# Patient Record
Sex: MALE | Race: BLACK OR AFRICAN AMERICAN | NOT HISPANIC OR LATINO | URBAN - METROPOLITAN AREA
[De-identification: names, ages, dates, MRNs, and addresses within clinical notes are randomized per-mention and may not be internally consistent; named-entity substitution may affect disease eponyms.]

---

## 2023-12-18 ENCOUNTER — OFFICE VISIT (OUTPATIENT)
Dept: URBAN - METROPOLITAN AREA CLINIC 84 | Facility: CLINIC | Age: 73
End: 2023-12-18
Payer: MEDICARE

## 2023-12-18 VITALS
WEIGHT: 194.6 LBS | HEIGHT: 76 IN | DIASTOLIC BLOOD PRESSURE: 84 MMHG | HEART RATE: 89 BPM | TEMPERATURE: 97 F | BODY MASS INDEX: 23.7 KG/M2 | SYSTOLIC BLOOD PRESSURE: 146 MMHG

## 2023-12-18 DIAGNOSIS — R10.33 PERIUMBILICAL PAIN: ICD-10-CM

## 2023-12-18 PROCEDURE — 99213 OFFICE O/P EST LOW 20 MIN: CPT | Performed by: INTERNAL MEDICINE

## 2023-12-18 RX ORDER — HYDROCODONE BITARTRATE AND ACETAMINOPHEN 5; 325 MG/1; MG/1
TABLET ORAL
Qty: 90 TABLET | Status: ACTIVE | COMMUNITY

## 2023-12-18 RX ORDER — FAMOTIDINE 20 MG/1
TABLET ORAL
Qty: 90 TABLET | Status: ACTIVE | COMMUNITY

## 2023-12-18 RX ORDER — ROSUVASTATIN CALCIUM 5 MG/1
TABLET, FILM COATED ORAL
Qty: 90 TABLET | Status: ACTIVE | COMMUNITY

## 2023-12-18 RX ORDER — DUTASTERIDE 0.5 MG/1
CAPSULE, LIQUID FILLED ORAL
Qty: 90 CAPSULE | Status: ACTIVE | COMMUNITY

## 2023-12-18 RX ORDER — GABAPENTIN 300 MG/1
CAPSULE ORAL
Qty: 180 CAPSULE | Status: ACTIVE | COMMUNITY

## 2024-02-26 ENCOUNTER — OV EP (OUTPATIENT)
Dept: URBAN - METROPOLITAN AREA CLINIC 84 | Facility: CLINIC | Age: 74
End: 2024-02-26
Payer: MEDICARE

## 2024-02-26 VITALS
DIASTOLIC BLOOD PRESSURE: 80 MMHG | SYSTOLIC BLOOD PRESSURE: 134 MMHG | BODY MASS INDEX: 22.82 KG/M2 | WEIGHT: 187.4 LBS | TEMPERATURE: 97 F | HEIGHT: 76 IN | HEART RATE: 73 BPM

## 2024-02-26 DIAGNOSIS — R10.33 PERIUMBILICAL PAIN: ICD-10-CM

## 2024-02-26 PROCEDURE — 99213 OFFICE O/P EST LOW 20 MIN: CPT | Performed by: INTERNAL MEDICINE

## 2024-02-26 RX ORDER — FAMOTIDINE 20 MG/1
TABLET ORAL
Qty: 90 TABLET | Status: ACTIVE | COMMUNITY

## 2024-02-26 RX ORDER — HYDROCODONE BITARTRATE AND ACETAMINOPHEN 5; 325 MG/1; MG/1
TABLET ORAL
Qty: 90 TABLET | Status: ACTIVE | COMMUNITY

## 2024-02-26 RX ORDER — ROSUVASTATIN CALCIUM 5 MG/1
TABLET, FILM COATED ORAL
Qty: 90 TABLET | Status: ACTIVE | COMMUNITY

## 2024-02-26 RX ORDER — GABAPENTIN 300 MG/1
CAPSULE ORAL
Qty: 180 CAPSULE | Status: ACTIVE | COMMUNITY

## 2024-02-26 RX ORDER — DUTASTERIDE 0.5 MG/1
CAPSULE, LIQUID FILLED ORAL
Qty: 90 CAPSULE | Status: ACTIVE | COMMUNITY

## 2024-02-26 NOTE — HPI-TODAY'S VISIT:
74 yo pt presents for f/u of abd discomfort. He states that sxs have resolved without abd ultrasound.